# Patient Record
Sex: FEMALE | Race: WHITE | NOT HISPANIC OR LATINO
[De-identification: names, ages, dates, MRNs, and addresses within clinical notes are randomized per-mention and may not be internally consistent; named-entity substitution may affect disease eponyms.]

---
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
## 2021-01-10 PROBLEM — Z84.89 FAMILY HISTORY OF SYNCOPE: Status: ACTIVE | Noted: 2019-02-19

## 2021-01-10 PROBLEM — I34.1 MITRAL VALVE PROLAPSE SYNDROME: Status: ACTIVE | Noted: 2021-01-10

## 2021-01-10 PROBLEM — R42 DIZZINESS: Status: ACTIVE | Noted: 2019-02-19

## 2021-01-10 NOTE — REASON FOR VISIT
[Follow-Up] : a follow-up visit for [Dizziness/Lightheadedness] : dizziness/lightheadedness [Patient] : patient [Mother] : mother [FreeTextEntry3] : trivial MVP/bowing with mild mitral regurgitation.  Mottled legs-history of Raynaud's.

## 2021-01-10 NOTE — CONSULT LETTER
[Today's Date] : [unfilled] [Name] : Name: [unfilled] [] : : ~~ [Today's Date:] : [unfilled] [Dear  ___:] : Dear Dr. [unfilled]: [Consult] : I had the pleasure of evaluating your patient, [unfilled]. My full evaluation follows. [Consult - Single Provider] : Thank you very much for allowing me to participate in the care of this patient. If you have any questions, please do not hesitate to contact me. [Sincerely,] : Sincerely, [FreeTextEntry4] : Oniel Roberts MD [FreeTextEntry5] : [address] [FreeTextEntry6] : [place] [address] [FreeTextEnjiu0] : Phone# [phone] [de-identified] : Emmett Schultz MD, FAAP, FACC, LYNDSAY, AUGUSTIN \par Chief, Pediatric Cardiology \par [facility] \par Director, Ambulatory Pediatric Cardiology \par [facility]

## 2021-01-10 NOTE — PHYSICAL EXAM
[General Appearance - In No Acute Distress] : in no acute distress [General Appearance - Alert] : alert [General Appearance - Well Developed] : well developed [General Appearance - Well-Appearing] : well appearing [Appearance Of Head] : the head was normocephalic [Facies] : there were no dysmorphic facial features [Sclera] : the conjunctiva were normal [Outer Ear] : the ears and nose were normal in appearance [Examination Of The Oral Cavity] : mucous membranes were moist and pink [Respiration, Rhythm And Depth] : normal respiratory rhythm and effort [Auscultation Breath Sounds / Voice Sounds] : breath sounds clear to auscultation bilaterally [No Cough] : no cough [Stridor] : no stridor was observed [Chest Palpation Tender Sternum] : no chest wall tenderness [Normal Chest Appearance] : the chest was normal in appearance [Apical Impulse] : quiet precordium with normal apical impulse [Heart Rate And Rhythm] : normal heart rate and rhythm [Heart Sounds] : normal S1 and S2 [No Murmur] : no murmurs  [Heart Sounds Gallop] : no gallops [Heart Sounds Pericardial Friction Rub] : no pericardial rub [Heart Sounds Click] : no clicks [Edema] : no edema [Arterial Pulses] : normal upper and lower extremity pulses with no pulse delay [Capillary Refill Test] : normal capillary refill [Bowel Sounds] : normal bowel sounds [Abdomen Soft] : soft [Nondistended] : nondistended [Abdomen Tenderness] : non-tender [] : no hepato-splenomegaly [Musculoskeletal Exam: Normal Movement Of All Extremities] : normal movements of all extremities [Musculoskeletal - Swelling] : no joint swelling seen [Musculoskeletal - Tenderness] : no joint tenderness was elicited [Nail Clubbing] : no clubbing  or cyanosis of the fingers [Motor Tone] : normal muscle strength and tone [Cervical Lymph Nodes Enlarged Posterior] : The posterior cervical nodes were normal [Cervical Lymph Nodes Enlarged Anterior] : The anterior cervical nodes were normal [Skin Turgor] : normal turgor [Demonstrated Behavior - Infant Nonreactive To Parents] : interactive [FreeTextEntry1] : Height 88th percentile, weight 32nd percentile, BMI 9th percentile.  No orthostatic changes in heart rate or blood pressure while standing for 3 minutes. [Fingers] :  capillary refill of the fingers was normal

## 2021-01-10 NOTE — REVIEW OF SYSTEMS
[Dizziness] : dizziness [Fever] : no fever [Feeling Poorly] : not feeling poorly (malaise) [Wgt Loss (___ Lbs)] : no recent weight loss [Pallor] : not pale [Eye Discharge] : no eye discharge [Redness] : no redness [Change in Vision] : no change in vision [Nasal Stuffiness] : no nasal congestion [Sore Throat] : no sore throat [Earache] : no earache [Loss Of Hearing] : no hearing loss [Cyanosis] : no cyanosis [Edema] : no edema [Diaphoresis] : not diaphoretic [Exercise Intolerance] : no persistence of exercise intolerance [Palpitations] : no palpitations [Orthopnea] : no orthopnea [Fast HR] : no tachycardia [Tachypnea] : not tachypneic [Wheezing] : no wheezing [Cough] : no cough [Shortness Of Breath] : not expressed as feeling short of breath [Vomiting] : no vomiting [Diarrhea] : no diarrhea [Abdominal Pain] : no abdominal pain [Decrease In Appetite] : appetite not decreased [Seizure] : no seizures [Fainting (Syncope)] : no fainting [Headache] : no headache [Limping] : no limping [Joint Pains] : no arthralgias [Joint Swelling] : no joint swelling [Wound problems] : no wound problems [Rash] : no rash [Easy Bruising] : no tendency for easy bruising [Swollen Glands] : no lymphadenopathy [Easy Bleeding] : no ~M tendency for easy bleeding [Nosebleeds] : no epistaxis [Sleep Disturbances] : ~T no sleep disturbances [Depression] : no depression [Hyperactive] : no hyperactive behavior [Anxiety] : no anxiety [Failure To Thrive] : no failure to thrive [Short Stature] : short stature was not noted [Jitteriness] : no jitteriness [Heat/Cold Intolerance] : no temperature intolerance [Dec Urine Output] : no oliguria [FreeTextEntry1] : increased mottling in lower extremities.

## 2021-01-10 NOTE — HISTORY OF PRESENT ILLNESS
[FreeTextEntry1] : Connie is a 20 year old female teenager with a history of Raynaud's and Fibromyalgia who initially underwent a complete cardiac evaluation in our office on February 19, 2019 in regard to complaints of dizziness and evaluation of a murmur that was appreciated by Dr. Balderrama (rheumatologist).  Incidentally her echocardiogram on the above date demonstrated trivial mitral bowing/prolapse with mild mitral regurgitation (not clinically significant).\par \par Today she presents for a routine cardiac reevaluation, due to continued complaints of occasional dizziness and increased purple/mottling in her legs while in the shower (tepid water). Connie brought photos of her legs which demonstrated significant mottling.\par \par She reports that she is remaining hydrated and consuming salty snacks.  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  \par \par Connie has no known allergies and her immunizations (including her influenza vaccination) are up to date.  She denies the use of tobacco.

## 2021-01-10 NOTE — CARDIOLOGY SUMMARY
[de-identified] : November 16, 2020 [FreeTextEntry1] : Normal sinus rhythm at 83 bpm.  QRS axis +74 degrees.  GA 0.136, QRS 0.078, QTc 0.432.  Normal ventricular voltages and no significant ST or T wave abnormalities.  No cardiac ectopy. [de-identified] : November 16, 2020 [FreeTextEntry2] : See report for details.  Mild mitral valve prolapse with only trivial mitral regurgitation (not hemodynamically significant).  Normal aortic root diameter.  Normal right and left ventricular systolic function and no pulmonary hypertension.  Normal cardiac valves with normal Doppler flow profiles.  No pericardial effusion.

## 2021-01-10 NOTE — CLINICAL NARRATIVE
[Up to Date] : Up to Date [FreeTextEntry2] : Connie is a 20 year old female teenager with a history of Raynaud's and Fibromyalgia who initially underwent a complete cardiac evaluation in our office on 02/19/2019 in regard to a recently diagnosed murmur complaints of dizziness.  incidentally her echocardiogram on the above date demonstrated trivial mitral bowing/prolapse with mild mitral regurgitation (not clinically significant).\par \par Today she presents for a routine cardiac reevaluation, continued complaints of occasional dizziness and increased purple/mottling in her legs while in the shower (temped water). Connie brought photos of her legs which demonstrated significant mottling.\par She reports that she is remaining hydrated and consuming salty snacks.  Urine color remains pale to clear.\par She is currently in her sophomore year at [employer].\par There has been no other change in her medical or family history.  There are no known allergies and his immunizations including her influenza vaccine are up to date.  She denies the use of tobacco.

## 2024-11-25 DIAGNOSIS — Z00.5 ENCOUNTER FOR EXAMINATION OF POTENTIAL DONOR OF ORGAN AND TISSUE: ICD-10-CM

## 2024-12-03 ENCOUNTER — LABORATORY RESULT (OUTPATIENT)
Age: 24
End: 2024-12-03

## 2024-12-24 ENCOUNTER — LABORATORY RESULT (OUTPATIENT)
Age: 24
End: 2024-12-24

## 2024-12-24 ENCOUNTER — APPOINTMENT (OUTPATIENT)
Dept: RADIOLOGY | Facility: IMAGING CENTER | Age: 24
End: 2024-12-24
Payer: SUBSIDIZED

## 2024-12-24 ENCOUNTER — OUTPATIENT (OUTPATIENT)
Dept: OUTPATIENT SERVICES | Facility: HOSPITAL | Age: 24
LOS: 1 days | End: 2024-12-24
Payer: SUBSIDIZED

## 2024-12-24 ENCOUNTER — APPOINTMENT (OUTPATIENT)
Dept: CT IMAGING | Facility: IMAGING CENTER | Age: 24
End: 2024-12-24
Payer: SUBSIDIZED

## 2024-12-24 ENCOUNTER — APPOINTMENT (OUTPATIENT)
Dept: NEPHROLOGY | Facility: CLINIC | Age: 24
End: 2024-12-24
Payer: SUBSIDIZED

## 2024-12-24 ENCOUNTER — APPOINTMENT (OUTPATIENT)
Dept: TRANSPLANT | Facility: CLINIC | Age: 24
End: 2024-12-24
Payer: SUBSIDIZED

## 2024-12-24 ENCOUNTER — RESULT REVIEW (OUTPATIENT)
Age: 24
End: 2024-12-24

## 2024-12-24 VITALS
DIASTOLIC BLOOD PRESSURE: 73 MMHG | RESPIRATION RATE: 18 BRPM | HEIGHT: 68 IN | SYSTOLIC BLOOD PRESSURE: 115 MMHG | WEIGHT: 117 LBS | TEMPERATURE: 99 F | BODY MASS INDEX: 17.73 KG/M2 | OXYGEN SATURATION: 100 % | HEART RATE: 98 BPM

## 2024-12-24 VITALS — DIASTOLIC BLOOD PRESSURE: 77 MMHG | SYSTOLIC BLOOD PRESSURE: 112 MMHG

## 2024-12-24 VITALS — WEIGHT: 117 LBS | BODY MASS INDEX: 18.15 KG/M2 | HEIGHT: 67.5 IN

## 2024-12-24 DIAGNOSIS — Z00.8 ENCOUNTER FOR OTHER GENERAL EXAMINATION: ICD-10-CM

## 2024-12-24 DIAGNOSIS — Z00.5 ENCOUNTER FOR EXAMINATION OF POTENTIAL DONOR OF ORGAN AND TISSUE: ICD-10-CM

## 2024-12-24 PROCEDURE — 74174 CTA ABD&PLVS W/CONTRAST: CPT

## 2024-12-24 PROCEDURE — 74174 CTA ABD&PLVS W/CONTRAST: CPT | Mod: 26

## 2024-12-24 PROCEDURE — 71045 X-RAY EXAM CHEST 1 VIEW: CPT | Mod: 26

## 2024-12-24 PROCEDURE — 71045 X-RAY EXAM CHEST 1 VIEW: CPT

## 2024-12-24 PROCEDURE — 99215 OFFICE O/P EST HI 40 MIN: CPT

## 2024-12-24 PROCEDURE — 99205 OFFICE O/P NEW HI 60 MIN: CPT

## 2024-12-27 ENCOUNTER — TRANSCRIPTION ENCOUNTER (OUTPATIENT)
Age: 24
End: 2024-12-27

## 2025-01-16 NOTE — CONSULT LETTER
Problem: Skin  Goal: Decreased wound size/increased tissue granulation at next dressing change  Outcome: Progressing  Flowsheets (Taken 1/16/2025 0124)  Decreased wound size/increased tissue granulation at next dressing change: Promote sleep for wound healing  Goal: Participates in plan/prevention/treatment measures  Outcome: Progressing  Flowsheets (Taken 1/16/2025 0124)  Participates in plan/prevention/treatment measures: Elevate heels  Goal: Prevent/manage excess moisture  Outcome: Progressing  Flowsheets (Taken 1/16/2025 0124)  Prevent/manage excess moisture: Moisturize dry skin  Goal: Prevent/minimize sheer/friction injuries  Outcome: Progressing  Flowsheets (Taken 1/16/2025 0124)  Prevent/minimize sheer/friction injuries:   HOB 30 degrees or less   Turn/reposition every 2 hours/use positioning/transfer devices  Goal: Promote/optimize nutrition  Outcome: Progressing  Flowsheets (Taken 1/16/2025 0124)  Promote/optimize nutrition: Consume > 50% meals/supplements  Goal: Promote skin healing  Outcome: Progressing  Flowsheets (Taken 1/16/2025 0124)  Promote skin healing:   Turn/reposition every 2 hours/use positioning/transfer devices   Protective dressings over bony prominences      [Dear  ___] : Dear  [unfilled], [Courtesy Letter:] : I had the pleasure of seeing your patient, [unfilled], in my office today. [Please see my note below.] : Please see my note below. [Consult Closing:] : Thank you very much for allowing me to participate in the care of this patient.  If you have any questions, please do not hesitate to contact me. [Sincerely,] : Sincerely, [FreeTextEntry2] : Forward to family  [FreeTextEntry3] : Argentina Cesar MD\par , [employer] [facility]\par Department of Pediatric Neurology\par Concussion Specialist\par [facility] \par [facility]\par [address]\par [facility], [address]\par Tel: [phone]\par Fax: [phone]\par \par \par

## 2025-02-12 ENCOUNTER — OUTPATIENT (OUTPATIENT)
Dept: OUTPATIENT SERVICES | Facility: HOSPITAL | Age: 25
LOS: 1 days | End: 2025-02-12
Payer: SUBSIDIZED

## 2025-02-12 ENCOUNTER — APPOINTMENT (OUTPATIENT)
Dept: PSYCHIATRY | Facility: CLINIC | Age: 25
End: 2025-02-12

## 2025-02-12 PROCEDURE — 90791 PSYCH DIAGNOSTIC EVALUATION: CPT

## 2025-02-13 DIAGNOSIS — F41.9 ANXIETY DISORDER, UNSPECIFIED: ICD-10-CM

## 2025-02-21 LAB
CREAT SERPL-MCNC: 0.74 MG/DL
EGFR: 117 ML/MIN/1.73M2

## 2025-02-25 DIAGNOSIS — F90.9 ATTENTION-DEFICIT HYPERACTIVITY DISORDER, UNSPECIFIED TYPE: ICD-10-CM

## 2025-02-25 DIAGNOSIS — Z00.5 ENCOUNTER FOR EXAMINATION OF POTENTIAL DONOR OF ORGAN AND TISSUE: ICD-10-CM

## 2025-02-27 ENCOUNTER — NON-APPOINTMENT (OUTPATIENT)
Age: 25
End: 2025-02-27

## 2025-05-20 ENCOUNTER — NON-APPOINTMENT (OUTPATIENT)
Age: 25
End: 2025-05-20